# Patient Record
Sex: MALE | Race: WHITE | NOT HISPANIC OR LATINO | Employment: FULL TIME | URBAN - METROPOLITAN AREA
[De-identification: names, ages, dates, MRNs, and addresses within clinical notes are randomized per-mention and may not be internally consistent; named-entity substitution may affect disease eponyms.]

---

## 2017-08-02 ENCOUNTER — ALLSCRIPTS OFFICE VISIT (OUTPATIENT)
Dept: OTHER | Facility: OTHER | Age: 27
End: 2017-08-02

## 2018-01-14 VITALS
SYSTOLIC BLOOD PRESSURE: 108 MMHG | TEMPERATURE: 98.6 F | WEIGHT: 157 LBS | BODY MASS INDEX: 22.48 KG/M2 | HEIGHT: 70 IN | DIASTOLIC BLOOD PRESSURE: 64 MMHG | HEART RATE: 76 BPM

## 2018-04-11 ENCOUNTER — TRANSCRIBE ORDERS (OUTPATIENT)
Dept: ADMINISTRATIVE | Facility: HOSPITAL | Age: 28
End: 2018-04-11

## 2018-04-11 ENCOUNTER — APPOINTMENT (OUTPATIENT)
Dept: RADIOLOGY | Facility: MEDICAL CENTER | Age: 28
End: 2018-04-11

## 2018-04-11 ENCOUNTER — OFFICE VISIT (OUTPATIENT)
Dept: FAMILY MEDICINE CLINIC | Facility: CLINIC | Age: 28
End: 2018-04-11

## 2018-04-11 VITALS
HEART RATE: 72 BPM | HEIGHT: 71 IN | DIASTOLIC BLOOD PRESSURE: 64 MMHG | TEMPERATURE: 98 F | SYSTOLIC BLOOD PRESSURE: 110 MMHG | WEIGHT: 173 LBS | BODY MASS INDEX: 24.22 KG/M2

## 2018-04-11 DIAGNOSIS — R04.2 HEMOPTYSIS: ICD-10-CM

## 2018-04-11 DIAGNOSIS — J02.9 SORE THROAT: ICD-10-CM

## 2018-04-11 DIAGNOSIS — R04.2 HEMOPTYSIS: Primary | ICD-10-CM

## 2018-04-11 PROCEDURE — 99212 OFFICE O/P EST SF 10 MIN: CPT | Performed by: FAMILY MEDICINE

## 2018-04-11 PROCEDURE — 71046 X-RAY EXAM CHEST 2 VIEWS: CPT

## 2018-04-11 NOTE — PATIENT INSTRUCTIONS
Hemoptysis   AMBULATORY CARE:   Hemoptysis  is coughing up blood  This occurs when blood vessels in your airway or lungs weaken or break, and begin to bleed  You may bleed in small or large amounts that appear in your sputum (spit)  Seek care immediately if:   · You have new or worsening chest pain or shortness of breath  · Your bleeding gets worse or you cough up a large amount of blood  · You cannot stop vomiting  · You are so dizzy that you think you may fall or faint  · You have pain or swelling in your legs  · Your legs and arms feel cold or look pale  Contact your healthcare provider if:   · You have new or increasing shortness of breath  · You have a fever  · You lose weight without trying  · You feel more weak and tired than usual     · You have a cough that does not improve or gets worse  · You have questions or concerns about your condition or care  Treatment  may include any of the following:  · Medicines  may be given to fight a bacterial infection or to control a cough  You may also need medicine to slow or stop the bleeding  · Take your medicine as directed  Contact your healthcare provider if you think your medicine is not helping or if you have side effects  Tell him or her if you are allergic to any medicine  Keep a list of the medicines, vitamins, and herbs you take  Include the amounts, and when and why you take them  Bring the list or the pill bottles to follow-up visits  Carry your medicine list with you in case of an emergency  · A saline rinse  of your nose and throat may help decrease or stop the bleeding  · Bronchial artery embolization  is a procedure to inject medicine into your damaged blood vessel  The medicine will help stop the bleeding  · Surgery  may be needed to help stop severe bleeding if other treatments do not work  Surgery may also be done to look for and correct other problems with your airway    Follow up with your healthcare provider in 2 days or as directed: You may need frequent visits to monitor your condition and prevent further blood loss  Write down your questions so you remember to ask them during your visits  Use caution with medicines:  Certain medicines, such as NSAIDs, increase your risk for bleeding  Herbal supplements also increase your risk  Examples of herbal supplements are garlic, gingko, and ginseng  Ask your healthcare provider before you take any over-the-counter medicines  Do not smoke, and do not go to smoky areas:  Smoke may worsen your hemoptysis  Nicotine and other chemicals in cigarettes and cigars can also cause lung damage  Ask your healthcare provider for information if you currently smoke and need help to quit  E-cigarettes or smokeless tobacco still contain nicotine  Talk to your healthcare provider before you use these products  © 2017 [address] [facility] Information is for End User's use only and may not be sold, redistributed or otherwise used for commercial purposes  All illustrations and images included in CareNotes® are the copyrighted property of [employer] D [employer]  or Edinson Jensen  The above information is an  only  It is not intended as medical advice for individual conditions or treatments  Talk to your doctor, nurse or pharmacist before following any medical regimen to see if it is safe and effective for you

## 2018-04-11 NOTE — ASSESSMENT & PLAN NOTE
Lung examination is clear, unclear why patient have hemoptysis, follow up on a chest x-ray, I feel this is related to his upper respiratory infection, to return to the office if symptoms not better to consider pulmonology referral

## 2018-04-11 NOTE — PROGRESS NOTES
Assessment/Plan:     Hemoptysis  Lung examination is clear, unclear why patient have hemoptysis, follow up on a chest x-ray, I feel this is related to his upper respiratory infection, to return to the office if symptoms not better to consider pulmonology referral     Sore throat  No evidence of infection, to use warm water and salt gargles  Diagnoses and all orders for this visit:    Hemoptysis  -     XR chest pa & lateral; Future    Sore throat          Subjective: Cc: patient c/o intermittent sore throat, coughing up mucous that is blood tinged, ear blockage off and on for 8 months  ak     Patient ID: Abdiel Coleman is a 24 y o  male  Coughing up blood for the last few months ,it was on/off , described it as blood tinged sputum last time he had it was yesterday, started 6 months ago on/off it usually associated with sore throat ,pt denied cough   Pt smoke cig 1-2 cig / day, pt stopped smoking lately, no SOB, WHEEZE  Pt has intermittent sore throat   Pt was at [facility] 9-10/2018       The following portions of the patient's history were reviewed and updated as appropriate: allergies, current medications, past family history, past medical history, past social history, past surgical history and problem list     Review of Systems   Constitutional: Negative for appetite change, chills and fever  HENT: Negative for congestion, sore throat and voice change  Eyes: Negative for pain and visual disturbance  Respiratory: Negative for cough  Cardiovascular: Negative for chest pain and palpitations  Gastrointestinal: Negative for abdominal pain, constipation, diarrhea and nausea  Endocrine: Negative for cold intolerance, heat intolerance and polyuria  Genitourinary: Negative for dysuria, enuresis, flank pain and frequency  Musculoskeletal: Negative for gait problem, joint swelling and neck pain  Skin: Negative for color change and wound     Neurological: Negative for dizziness, syncope, speech difficulty, numbness and headaches  Psychiatric/Behavioral: Negative for behavioral problems and confusion  The patient is not nervous/anxious  Objective:  Vitals:    04/11/18 1031   BP: 110/64   Pulse: 72   Temp: 98 °F (36 7 °C)   Weight: 78 5 kg (173 lb)   Height: 5' 11 3" (1 811 m)        Physical Exam   Constitutional: He is oriented to person, place, and time  He appears well-developed and well-nourished  HENT:   Head: Normocephalic and atraumatic  Eyes: Conjunctivae and EOM are normal  Pupils are equal, round, and reactive to light  Neck: Normal range of motion  Neck supple  Cardiovascular: Normal rate, regular rhythm, normal heart sounds and intact distal pulses  Pulmonary/Chest: Effort normal and breath sounds normal    Abdominal: Soft  Bowel sounds are normal    Musculoskeletal: Normal range of motion  Neurological: He is alert and oriented to person, place, and time  He has normal reflexes  Skin: Skin is warm and dry  Psychiatric: He has a normal mood and affect  His behavior is normal    Vitals reviewed

## 2018-04-17 ENCOUNTER — TELEPHONE (OUTPATIENT)
Dept: FAMILY MEDICINE CLINIC | Facility: CLINIC | Age: 28
End: 2018-04-17

## 2018-04-17 NOTE — TELEPHONE ENCOUNTER
Patient x-ray is completely negative, patient need to return to the office if his symptoms continued for further workup and suggestion

## 2022-04-19 ENCOUNTER — OCCMED (OUTPATIENT)
Dept: URGENT CARE | Facility: CLINIC | Age: 32
End: 2022-04-19

## 2022-04-19 DIAGNOSIS — Z02.6 ENCOUNTER RELATED TO WORKER'S COMPENSATION CLAIM: Primary | ICD-10-CM

## 2022-04-19 PROCEDURE — G0382 LEV 3 HOSP TYPE B ED VISIT: HCPCS | Performed by: PHYSICIAN ASSISTANT

## 2022-04-19 PROCEDURE — 90471 IMMUNIZATION ADMIN: CPT | Performed by: PHYSICIAN ASSISTANT

## 2022-04-19 PROCEDURE — 99283 EMERGENCY DEPT VISIT LOW MDM: CPT | Performed by: PHYSICIAN ASSISTANT

## 2022-04-19 PROCEDURE — 12001 RPR S/N/AX/GEN/TRNK 2.5CM/<: CPT | Performed by: PHYSICIAN ASSISTANT

## 2023-09-26 ENCOUNTER — OFFICE VISIT (OUTPATIENT)
Dept: URGENT CARE | Facility: CLINIC | Age: 33
End: 2023-09-26
Payer: COMMERCIAL

## 2023-09-26 VITALS
OXYGEN SATURATION: 99 % | DIASTOLIC BLOOD PRESSURE: 69 MMHG | TEMPERATURE: 97.8 F | SYSTOLIC BLOOD PRESSURE: 121 MMHG | HEART RATE: 79 BPM | RESPIRATION RATE: 18 BRPM

## 2023-09-26 DIAGNOSIS — L01.00 IMPETIGO: ICD-10-CM

## 2023-09-26 DIAGNOSIS — B08.4 HAND, FOOT AND MOUTH DISEASE: ICD-10-CM

## 2023-09-26 DIAGNOSIS — R21 RASH: Primary | ICD-10-CM

## 2023-09-26 PROCEDURE — 99213 OFFICE O/P EST LOW 20 MIN: CPT | Performed by: PHYSICIAN ASSISTANT

## 2023-09-26 RX ORDER — METHYLPREDNISOLONE 4 MG/1
TABLET ORAL
Qty: 1 EACH | Refills: 0 | Status: SHIPPED | OUTPATIENT
Start: 2023-09-26

## 2023-09-26 RX ORDER — CEPHALEXIN 500 MG/1
500 CAPSULE ORAL EVERY 8 HOURS SCHEDULED
Qty: 21 CAPSULE | Refills: 0 | Status: SHIPPED | OUTPATIENT
Start: 2023-09-26 | End: 2023-10-03

## 2023-09-26 NOTE — PATIENT INSTRUCTIONS
Take photos of lesions on face and scalp to document current status. May also take photos of lesions on hands and in mouth. Stop new shampoo. Use shampoo that you have used in the past without difficulty. Recommend using separate towel to dry face and scalp. Use new towel each time you bathe. Good handwashing. Make follow-up appointment with primary care for approximately 10 to 14 days for reevaluation. If severe worsening of symptoms, weakness, inability to eat or drink proceed to emergency room for further evaluation.

## 2023-09-26 NOTE — PROGRESS NOTES
[facility]    NAME: Robyn Lubin is a 34 y.o. male  : 1990    MRN: [id number]  DATE: 2023  TIME: 10:53 AM    Assessment and Plan   Rash [R21]  1. Rash  methylPREDNISolone 4 MG tablet therapy pack      2. Impetigo  cephalexin (KEFLEX) 500 mg capsule      3. Hand, foot and mouth disease            Patient Instructions   Patient Instructions   Take photos of lesions on face and scalp to document current status. May also take photos of lesions on hands and in mouth. Stop new shampoo. Use shampoo that you have used in the past without difficulty. Recommend using separate towel to dry face and scalp. Use new towel each time you bathe. Good handwashing. Make follow-up appointment with primary care for approximately 10 to 14 days for reevaluation. If severe worsening of symptoms, weakness, inability to eat or drink proceed to emergency room for further evaluation. Chief Complaint     Chief Complaint   Patient presents with   • Rash     Patient c/o a rash on his head and face x 1 day. Patient noted he changed his shampoo about 4-5 day ago. History of Present Illness   Robyn Lubin presents to the clinic c/o  21-year-old patient comes in with complaint of skin problem on scalp and face. Started: Noted itching on his head 2 nights ago. Rash on face started today. Associated signs and symptoms: Itchy lesions on scalp that seem to be spreading. Now lesions on face. Noted lesions on hands today. Some sore throat. Did have fever over the weekend with Tmax 103. Modifying factors: Stopped shampoo. Known Exposures: New shampoo. Around child recently diagnosed with hand-foot-and-mouth. Denies history of MRSA. Denies history of prior similar symptoms. Review of Systems   Review of Systems   Constitutional: Negative. Fever over the weekend. Resolved. HENT: Positive for sore throat. Negative for congestion, postnasal drip and rhinorrhea. Respiratory: Negative. Cardiovascular: Negative. Skin: Positive for rash. Current Medications     No long-term medications on file. Current Allergies     Allergies as of 09/26/2023   • (No Known Allergies)          The following portions of the patient's history were reviewed and updated as appropriate: allergies, current medications, past family history, past medical history, past social history, past surgical history and problem list.  Past Medical History:   Diagnosis Date   • Lyme disease      History reviewed. No pertinent surgical history. History reviewed. No pertinent family history. Objective   /69   Pulse 79   Temp 97.8 °F (36.6 °C)   Resp 18   SpO2 99%   No LMP for male patient. Physical Exam     Physical Exam  Vitals and nursing note reviewed. Constitutional:       General: He is not in acute distress. Appearance: He is well-developed. He is not ill-appearing, toxic-appearing or diaphoretic. Comments: Accompanied by girlfriend   HENT:      Head: Normocephalic and atraumatic. Nose: No congestion or rhinorrhea. Mouth/Throat:      Mouth: Mucous membranes are moist.      Pharynx: Posterior oropharyngeal erythema present. No oropharyngeal exudate. Comments: Small erythematous lesions posterior soft palate region without ulcerations  Eyes:      General:         Right eye: No discharge. Left eye: No discharge. Extraocular Movements: Extraocular movements intact. Conjunctiva/sclera: Conjunctivae normal.      Pupils: Pupils are equal, round, and reactive to light. Cardiovascular:      Rate and Rhythm: Normal rate. Pulmonary:      Effort: Pulmonary effort is normal.   Musculoskeletal:      Cervical back: Normal range of motion and neck supple. No rigidity or tenderness. Lymphadenopathy:      Cervical: No cervical adenopathy. Skin:     General: Skin is warm and dry. Findings: Rash present.       Comments: Scalp with multiple papules with redness and possible mild crusting. Face with sparsely scattered red papular lesions. No overt pustules or vesicles. Palms of hands with red papular lesions. Neurological:      General: No focal deficit present. Mental Status: He is alert and oriented to person, place, and time.    Psychiatric:         Mood and Affect: Mood normal.         Behavior: Behavior normal.

## 2025-03-11 ENCOUNTER — OFFICE VISIT (OUTPATIENT)
Dept: FAMILY MEDICINE CLINIC | Facility: CLINIC | Age: 35
End: 2025-03-11
Payer: COMMERCIAL

## 2025-03-11 VITALS
SYSTOLIC BLOOD PRESSURE: 124 MMHG | HEIGHT: 71 IN | OXYGEN SATURATION: 96 % | DIASTOLIC BLOOD PRESSURE: 82 MMHG | HEART RATE: 79 BPM | BODY MASS INDEX: 25.87 KG/M2 | WEIGHT: 184.8 LBS

## 2025-03-11 DIAGNOSIS — Z13.6 SCREENING FOR HEART DISEASE: ICD-10-CM

## 2025-03-11 DIAGNOSIS — Z82.49 FH: CAD (CORONARY ARTERY DISEASE): ICD-10-CM

## 2025-03-11 DIAGNOSIS — J01.00 ACUTE NON-RECURRENT MAXILLARY SINUSITIS: Primary | ICD-10-CM

## 2025-03-11 DIAGNOSIS — Z86.19 HX OF LYME DISEASE: ICD-10-CM

## 2025-03-11 PROCEDURE — 99204 OFFICE O/P NEW MOD 45 MIN: CPT | Performed by: PHYSICIAN ASSISTANT

## 2025-03-11 RX ORDER — CEFUROXIME AXETIL 500 MG/1
500 TABLET ORAL EVERY 12 HOURS SCHEDULED
Qty: 20 TABLET | Refills: 0 | Status: SHIPPED | OUTPATIENT
Start: 2025-03-11 | End: 2025-03-21

## 2025-03-11 NOTE — ASSESSMENT & PLAN NOTE
Orders:  •  CBC and differential  •  Comprehensive metabolic panel  •  Lipid Panel with Direct LDL reflex  •  TSH, 3rd generation with Free T4 reflex

## 2025-03-11 NOTE — PROGRESS NOTES
Name: Buddy Franks      : 1990      MRN: [id number]  Encounter Provider: Ari Orellana PA-C  Encounter Date: 3/11/2025   Encounter department: [facility]  :  Assessment & Plan  Acute non-recurrent maxillary sinusitis  Patient will be started on Ceftin 500 mg twice daily for 10 days.  Recommend follow-up if symptoms persist.  Orders:  •  CBC and differential  •  Comprehensive metabolic panel  •  Lipid Panel with Direct LDL reflex  •  TSH, 3rd generation with Free T4 reflex  •  cefuroxime (CEFTIN) 500 mg tablet; Take 1 tablet (500 mg total) by mouth every 12 (twelve) hours for 10 days    Screening for heart disease  Patient does have family history of heart disease and will screen with lipid panel initially.  Orders:  •  CBC and differential  •  Comprehensive metabolic panel  •  Lipid Panel with Direct LDL reflex  •  TSH, 3rd generation with Free T4 reflex    FH: CAD (coronary artery disease)    Orders:  •  CBC and differential  •  Comprehensive metabolic panel  •  Lipid Panel with Direct LDL reflex  •  TSH, 3rd generation with Free T4 reflex    Hx of Lyme disease  Treated at age of 12 with antibiotic therapies.             Depression Screening and Follow-up Plan: Patient was screened for depression during today's encounter. They screened negative with a PHQ-2 score of 0.        History of Present Illness   HPI: This is a 34-year-old gentleman that presents to the office as a new patient.  He has not had any major illnesses aside from Lyme disease when he was 12 years old.  He was treated with antibiotic therapies at that time.  He does have a significant family history of coronary artery disease in his father.  He does not recall ever having a physical in adulthood or having routine blood test completed.  He does also complain of some sinus congestion and cough that has been relapsing and remitting for about 3 weeks.  He has not had any fevers and he has not felt excessively  "rundown or fatigued but he is having some sinus drainage and cough can be productive at times.  He has not had relief with over-the-counter medications.      Review of Systems   Constitutional:  Positive for activity change. Negative for chills, fatigue and fever.   HENT:  Positive for postnasal drip, rhinorrhea and sore throat. Negative for congestion, ear pain and sinus pressure.    Eyes:  Negative for visual disturbance.   Respiratory:  Negative for cough, chest tightness, shortness of breath and wheezing.    Cardiovascular:  Negative for chest pain and palpitations.   Gastrointestinal:  Negative for diarrhea, nausea and vomiting.   Endocrine: Negative for polyuria.   Genitourinary:  Negative for dysuria and frequency.   Musculoskeletal:  Negative for arthralgias and myalgias.   Skin:  Negative for pallor and rash.   Neurological:  Negative for dizziness, weakness, light-headedness, numbness and headaches.   Psychiatric/Behavioral:  Negative for agitation, behavioral problems and sleep disturbance.    All other systems reviewed and are negative.      Objective   /82 (BP Location: Left arm, Patient Position: Sitting, Cuff Size: Standard)   Pulse 79   Ht 5' 11.3\" (1.811 m)   Wt 83.8 kg (184 lb 12.8 oz)   SpO2 96%   BMI 25.56 kg/m²      Physical Exam  Constitutional:       General: He is not in acute distress.     Appearance: He is well-developed.   HENT:      Head: Normocephalic and atraumatic.      Right Ear: Tympanic membrane normal.      Left Ear: Tympanic membrane normal.      Nose: Congestion and rhinorrhea present.   Eyes:      Conjunctiva/sclera: Conjunctivae normal.   Cardiovascular:      Rate and Rhythm: Normal rate and regular rhythm.   Pulmonary:      Effort: Pulmonary effort is normal.   Abdominal:      General: Abdomen is flat. Bowel sounds are normal. There is no distension.      Palpations: Abdomen is soft. There is no mass.   Musculoskeletal:         General: Normal range of motion.      " Cervical back: Normal range of motion.   Skin:     General: Skin is warm.      Findings: No rash.   Neurological:      Mental Status: He is alert and oriented to person, place, and time.   Psychiatric:         Mood and Affect: Mood normal.